# Patient Record
Sex: MALE | Race: WHITE
[De-identification: names, ages, dates, MRNs, and addresses within clinical notes are randomized per-mention and may not be internally consistent; named-entity substitution may affect disease eponyms.]

---

## 2018-09-18 ENCOUNTER — HOSPITAL ENCOUNTER (EMERGENCY)
Dept: HOSPITAL 62 - ER | Age: 58
Discharge: HOME | End: 2018-09-18
Payer: SELF-PAY

## 2018-09-18 VITALS — SYSTOLIC BLOOD PRESSURE: 121 MMHG | DIASTOLIC BLOOD PRESSURE: 94 MMHG

## 2018-09-18 DIAGNOSIS — R42: ICD-10-CM

## 2018-09-18 DIAGNOSIS — E86.0: Primary | ICD-10-CM

## 2018-09-18 DIAGNOSIS — I10: ICD-10-CM

## 2018-09-18 DIAGNOSIS — R91.1: ICD-10-CM

## 2018-09-18 DIAGNOSIS — R73.9: ICD-10-CM

## 2018-09-18 DIAGNOSIS — G93.9: ICD-10-CM

## 2018-09-18 DIAGNOSIS — R11.0: ICD-10-CM

## 2018-09-18 LAB
ADD MANUAL DIFF: NO
ALBUMIN SERPL-MCNC: 4.2 G/DL (ref 3.5–5)
ALP SERPL-CCNC: 228 U/L (ref 38–126)
ALT SERPL-CCNC: 293 U/L (ref 21–72)
ANION GAP SERPL CALC-SCNC: 14 MMOL/L (ref 5–19)
AST SERPL-CCNC: 148 U/L (ref 17–59)
BASOPHILS # BLD AUTO: 0.1 10^3/UL (ref 0–0.2)
BASOPHILS NFR BLD AUTO: 0.8 % (ref 0–2)
BILIRUB DIRECT SERPL-MCNC: 0.7 MG/DL (ref 0–0.4)
BILIRUB SERPL-MCNC: 1.3 MG/DL (ref 0.2–1.3)
BUN SERPL-MCNC: 28 MG/DL (ref 7–20)
CALCIUM: 10.9 MG/DL (ref 8.4–10.2)
CHLORIDE SERPL-SCNC: 99 MMOL/L (ref 98–107)
CK MB SERPL-MCNC: 1.22 NG/ML (ref ?–4.55)
CK SERPL-CCNC: 98 U/L (ref 55–170)
CO2 SERPL-SCNC: 23 MMOL/L (ref 22–30)
EOSINOPHIL # BLD AUTO: 0.4 10^3/UL (ref 0–0.6)
EOSINOPHIL NFR BLD AUTO: 3 % (ref 0–6)
ERYTHROCYTE [DISTWIDTH] IN BLOOD BY AUTOMATED COUNT: 13.2 % (ref 11.5–14)
GLUCOSE SERPL-MCNC: 66 MG/DL (ref 75–110)
HCT VFR BLD CALC: 50.8 % (ref 37.9–51)
HGB BLD-MCNC: 17.4 G/DL (ref 13.5–17)
LYMPHOCYTES # BLD AUTO: 5.3 10^3/UL (ref 0.5–4.7)
LYMPHOCYTES NFR BLD AUTO: 43.4 % (ref 13–45)
MCH RBC QN AUTO: 30.2 PG (ref 27–33.4)
MCHC RBC AUTO-ENTMCNC: 34.2 G/DL (ref 32–36)
MCV RBC AUTO: 88 FL (ref 80–97)
MONOCYTES # BLD AUTO: 1 10^3/UL (ref 0.1–1.4)
MONOCYTES NFR BLD AUTO: 8 % (ref 3–13)
NEUTROPHILS # BLD AUTO: 5.5 10^3/UL (ref 1.7–8.2)
NEUTS SEG NFR BLD AUTO: 44.8 % (ref 42–78)
PLATELET # BLD: 278 10^3/UL (ref 150–450)
POTASSIUM SERPL-SCNC: 4.3 MMOL/L (ref 3.6–5)
PROT SERPL-MCNC: 7.3 G/DL (ref 6.3–8.2)
RBC # BLD AUTO: 5.75 10^6/UL (ref 4.35–5.55)
SODIUM SERPL-SCNC: 136.4 MMOL/L (ref 137–145)
TOTAL CELLS COUNTED % (AUTO): 100 %
TROPONIN I SERPL-MCNC: < 0.012 NG/ML
WBC # BLD AUTO: 12.3 10^3/UL (ref 4–10.5)

## 2018-09-18 PROCEDURE — 71045 X-RAY EXAM CHEST 1 VIEW: CPT

## 2018-09-18 PROCEDURE — 96375 TX/PRO/DX INJ NEW DRUG ADDON: CPT

## 2018-09-18 PROCEDURE — 84484 ASSAY OF TROPONIN QUANT: CPT

## 2018-09-18 PROCEDURE — 96376 TX/PRO/DX INJ SAME DRUG ADON: CPT

## 2018-09-18 PROCEDURE — S0028 INJECTION, FAMOTIDINE, 20 MG: HCPCS

## 2018-09-18 PROCEDURE — 70450 CT HEAD/BRAIN W/O DYE: CPT

## 2018-09-18 PROCEDURE — 96374 THER/PROPH/DIAG INJ IV PUSH: CPT

## 2018-09-18 PROCEDURE — A9576 INJ PROHANCE MULTIPACK: HCPCS

## 2018-09-18 PROCEDURE — 70553 MRI BRAIN STEM W/O & W/DYE: CPT

## 2018-09-18 PROCEDURE — 93010 ELECTROCARDIOGRAM REPORT: CPT

## 2018-09-18 PROCEDURE — 85025 COMPLETE CBC W/AUTO DIFF WBC: CPT

## 2018-09-18 PROCEDURE — 99291 CRITICAL CARE FIRST HOUR: CPT

## 2018-09-18 PROCEDURE — 96361 HYDRATE IV INFUSION ADD-ON: CPT

## 2018-09-18 PROCEDURE — 93005 ELECTROCARDIOGRAM TRACING: CPT

## 2018-09-18 PROCEDURE — 80053 COMPREHEN METABOLIC PANEL: CPT

## 2018-09-18 PROCEDURE — 36415 COLL VENOUS BLD VENIPUNCTURE: CPT

## 2018-09-18 PROCEDURE — 82553 CREATINE MB FRACTION: CPT

## 2018-09-18 PROCEDURE — 82550 ASSAY OF CK (CPK): CPT

## 2018-09-18 PROCEDURE — 82962 GLUCOSE BLOOD TEST: CPT

## 2018-09-18 NOTE — RADIOLOGY REPORT (SQ)
EXAM DESCRIPTION:  CT HEAD WITHOUT



COMPLETED DATE/TIME:  9/18/2018 1:10 pm



REASON FOR STUDY:  Difficulty walking/dizziness



COMPARISON:  None.



TECHNIQUE:  Axial images acquired through the brain without intravenous contrast.  Images reviewed wi
th bone, brain and subdural windows.  Additional sagittal and coronal reconstructions were generated.
 Images stored on PACS.

All CT scanners at this facility use dose modulation, iterative reconstruction, and/or weight based d
osing when appropriate to reduce radiation dose to as low as reasonably achievable (ALARA).

[facility]: Dose Right  [facility]: CareDose    [facility]: Dose Right    [facility]: Teradose 4D    [facility]: Smart Technologies



RADIATION DOSE:  CT Rad equipment meets quality standard of care and radiation dose reduction techniq
ues were employed. CTDIvol: 53.2 mGy. DLP: 1124 mGy-cm. mGy.



LIMITATIONS:  None.



FINDINGS:  VENTRICLES: Mild dilatation of the lateral and 3rd ventricles.

CEREBRUM: There are scattered subcentimeter foci of high attenuation in both cerebral hemispheres. [place]
spect these are partially calcified metastatic lesions although cannot entirely exclude petechial hem
orrhages.  There is approximately 1.3 cm partially calcified lesion in the pineal gland.

CEREBELLUM: Cannot exclude similar lesions in the posterior fossa however these are less well defined
.

EXTRAAXIAL SPACES: No fluid collections.  No masses.

ORBITS AND GLOBE: No intra- or extraconal masses.  Normal contour of globe without masses.

CALVARIUM: No fracture.

PARANASAL SINUSES: Trace fluid right sphenoid sinus.

SOFT TISSUES: No mass or hematoma.

OTHER: No other significant finding.



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
nd scattered potential metastatic lesions.  Other possibilities include infectious or inflammatory pr
ocess (sarcoid) with an incidental pineal mass.

EVIDENCE OF ACUTE STROKE: NO.



COMMENT:  Findings were called to Dr Badillo at 1315 hours.

Quality ID # [id number]: Final reports with documentation of one or more dose reduction techniques (e.g., Au
tomated exposure control, adjustment of the mA and/or kV according to patient size, use of iterative 
reconstruction technique)



TECHNICAL DOCUMENTATION:  JOB ID:  [id number]

 2011 [employer]- All Rights Reserved



Reading location - [facility]station [facility]

## 2018-09-18 NOTE — ER DOCUMENT REPORT
ED Medical Screen (RME)





- General


Chief Complaint: Breathing Difficulty


Stated Complaint: DIFFICULTY BREATHING,DIZZY


Time Seen by Provider: 09/18/18 11:44


Notes: 





58 years old male with a history of hypertension presents today with 2-3 day 

history of persistent nausea unable to eat anything feeling dizzy wobbly when 

walking tends to fall, as well as difficulty in breathing.


He is otherwise very active individual who works 7 days a week.  Strenuous work.


Denies any chest pain.  Denies any left arm numbness tingling sensation.  

Denies any focal weaknesses.





On examination-appears slightly confused hypotensive and hypoglycemic.  Appears 

weak and dehydrated.


TRAVEL OUTSIDE OF THE [place] IN LAST 30 DAYS: No





- Related Data


Allergies/Adverse Reactions: 


 





No Known Allergies Allergy (Unverified 09/18/18 11:22)


 











Past Medical History





- Social History


Chew tobacco use (# tins/day): No


Drug Abuse: None


Renal/ Medical History: Denies: Hx Peritoneal Dialysis





Physical Exam





- Vital signs


Vitals: 





 











Temp Pulse Resp BP Pulse Ox


 


 97.4 F   93   14   68/41 L  93 


 


 09/18/18 11:31  09/18/18 11:31  09/18/18 11:31  09/18/18 11:31  09/18/18 11:31














Course





- Vital Signs


Vital signs: 





 











Temp Pulse Resp BP Pulse Ox


 


 97.4 F   93   14   68/41 L  93 


 


 09/18/18 11:31  09/18/18 11:31  09/18/18 11:31  09/18/18 11:31  09/18/18 11:31

## 2018-09-18 NOTE — EKG REPORT
SEVERITY:- ABNORMAL ECG -

SINUS RHYTHM

PROBABLE LEFT ATRIAL ABNORMALITY

CONSIDER RIGHT VENTRICULAR HYPERTROPHY

BORDERLINE T ABNORMALITIES, INFERIOR LEADS

PROLONGED QT INTERVAL

:

Confirmed by: Roxann Alamo MD 18-Sep-2018 15:25:06

## 2018-09-18 NOTE — RADIOLOGY REPORT (SQ)
EXAM DESCRIPTION:  CHEST SINGLE VIEW



COMPLETED DATE/TIME:  9/18/2018 1:03 pm



REASON FOR STUDY:  Shortness of breath



COMPARISON:  None.



EXAM PARAMETERS:  NUMBER OF VIEWS: One view.

TECHNIQUE: Single frontal radiographic view of the chest acquired.

RADIATION DOSE: NA

LIMITATIONS: None.



FINDINGS:  LUNGS AND PLEURA: Cannot exclude 13 mm suprahilar nodule on the right.  No acute infiltrat
e or pleural effusion.

MEDIASTINUM AND HILAR STRUCTURES: No masses.  Contour normal.

HEART AND VASCULAR STRUCTURES: Heart normal in size.  Normal vasculature.

BONES: No acute findings.

HARDWARE: None in the chest.

OTHER: No other significant finding.



IMPRESSION:  Possible small suprahilar pulmonary nodule on the right.



TECHNICAL DOCUMENTATION:  JOB ID:  [id number]

 2011 [employer]- All Rights Reserved



Reading location - IP/workstation name: [email or web address]

## 2018-09-18 NOTE — ER DOCUMENT REPORT
ED General





- General


Chief Complaint: Breathing Difficulty


Stated Complaint: DIFFICULTY BREATHING,DIZZY


Time Seen by Provider: 09/18/18 11:44


TRAVEL OUTSIDE OF THE [place] IN LAST 30 DAYS: No





- HPI


Patient complains to provider of: Dizziness nausea feeling unwell


Notes: 





Patient coming in for dizziness feeling unwell nausea over the last few days.  

Patient states decreased p.o. intake.  Upon patient's evaluation was found to 

be markedly hypertensive and hyperglycemic.  Patient had multiple liters of 

fluid instilled and also was given something to eat.  Recheck of Accu-Chek that 

showed increase of his blood sugar.  Patient otherwise denies any past medical 

history denies any head trauma.  Denies any fevers chills diarrhea exposure to 

contaminated water or food.  Patient resting comfortably upon my evaluation 

denies any head pain chest pain abdominal pain.





- Related Data


Allergies/Adverse Reactions: 


 





No Known Allergies Allergy (Unverified 09/18/18 11:22)


 











Past Medical History





- Social History


Smoking Status: Never Smoker


Chew tobacco use (# tins/day): No


Drug Abuse: None


Family History: Reviewed & Not Pertinent


Patient has suicidal ideation: No


Patient has homicidal ideation: No


Renal/ Medical History: Denies: Hx Peritoneal Dialysis





Review of Systems





- Review of Systems


Constitutional: Other - Dizziness feeling unwell


EENT: No symptoms reported


Cardiovascular: No symptoms reported


Respiratory: No symptoms reported


Gastrointestinal: Nausea


Genitourinary: No symptoms reported


Male Genitourinary: No symptoms reported


Musculoskeletal: No symptoms reported


Skin: No symptoms reported


Hematologic/Lymphatic: No symptoms reported


Neurological/Psychological: No symptoms reported


-: Yes All other systems reviewed and negative





Physical Exam





- Vital signs


Vitals: 


 











Temp Pulse Resp BP Pulse Ox


 


 97.4 F   93   14   68/41 L  93 


 


 09/18/18 11:31  09/18/18 11:31  09/18/18 11:31  09/18/18 11:31  09/18/18 11:31











Interpretation: Normal





- General


General appearance: Appears well, Alert





- HEENT


Head: Normocephalic, Atraumatic


Eyes: Normal


Pupils: PERRL





- Respiratory


Respiratory status: No respiratory distress


Chest status: Nontender


Breath sounds: Normal


Chest palpation: Normal





- Cardiovascular


Rhythm: Regular


Heart sounds: Normal auscultation


Murmur: No





- Abdominal


Inspection: Normal


Distension: No distension


Bowel sounds: Normal


Tenderness: Nontender


Organomegaly: No organomegaly





- Back


Back: Normal, Nontender





- Extremities


General upper extremity: Normal inspection, Nontender, Normal color, Normal ROM

, Normal temperature


General lower extremity: Normal inspection, Nontender, Normal color, Normal ROM

, Normal temperature, Normal weight bearing.  No: Benitez's sign





- Neurological


Neuro grossly intact: Yes


Cognition: Normal


Orientation: AAOx4


Pia Coma Scale Eye Opening: Spontaneous


[place] Coma Scale Verbal: Oriented


Pia Coma Scale Motor: Obeys Commands


Pia Coma Scale Total: 15


Speech: Normal


Motor strength normal: LUE, RUE, LLE, RLE


Sensory: Normal





- Psychological


Associated symptoms: Normal affect, Normal mood





- Skin


Skin Temperature: Warm


Skin Moisture: Dry


Skin Color: Normal





Course





- Re-evaluation


Re-evalutation: 





09/18/18 19:20


Patient had a CT scan of the head ordered in triage unfortunately showing 

multiple lesions in the brain calcifications possible small hemorrhagic areas 

as well discussion with the radiologist agrees with MRI for further evaluation 

unfortunately MRI showed multiple lesions concerning for metastatic cancer.  

Possibility of this being a long as patient does have a nodule on chest x-ray.  

Patient denies a history of being diagnosed with cancer the discussed the 

findings with our gynecologist on-call Dr. Mitchell requesting high-dose 

Decadron be given to the patient along with Pepcid Zofran for nausea and for 

the patient follow-up in her office tomorrow as that the lesions look to be 

causing any compression or impending herniation patient agrees with this 

assessment plan.  Hypotension resolved after multiple boluses of fluid more 

likely due to marked dehydration.  Patient able tolerate p.o. patient agrees 

with plan a follow-up





- Vital Signs


Vital signs: 


 











Temp Pulse Resp BP Pulse Ox


 


 97.4 F   93   21 H  110/79   93 


 


 09/18/18 11:31  09/18/18 11:31  09/18/18 14:01  09/18/18 14:01  09/18/18 11:31














- Laboratory


Result Diagrams: 


 09/18/18 12:34





 09/18/18 12:34


Laboratory results interpreted by me: 


 











  09/18/18 09/18/18





  12:34 12:34


 


WBC  12.3 H 


 


RBC  5.75 H 


 


Hgb  17.4 H 


 


Absolute Lymphocytes  5.3 H 


 


Sodium   136.4 L


 


BUN   28 H


 


Creatinine   1.76 H


 


Est GFR ( Amer)   49 L


 


Est GFR (Non-Af Amer)   40 L


 


Glucose   66 L


 


Calcium   10.9 H


 


Direct Bilirubin   0.7 H


 


AST   148 H


 


ALT   293 H


 


Alkaline Phosphatase   228 H














Critical Care Note





- Critical Care Note


Total time excluding time spent on procedures (mins): 35


Comments: 





Multiple evaluations as patient was hypotensive due to marked dehydration 

requiring multiple liters of fluid





Discharge





- Discharge


Clinical Impression: 


 Mass, brain, Dehydration





Nausea & vomiting


Qualifiers:


 Vomiting type: unspecified Vomiting Intractability: unspecified Qualified Code(

s): R11.2 - Nausea with vomiting, unspecified





Condition: Good


Instructions:  Dehydration ([facility]), Growth or Mass, Pending Workup ([facility]), Nausea 

or Vomiting, Nonspecific ([facility])


Additional Instructions: 


Your MRI of your brain today shows multiple lesions that is concerning for 

possible metastatic brain cancer.  This will need further workup by 

gynecologist.  I spoke to her oncologist on call Dr. Mitchell who requested that 

she go to her office tomorrow at 1:00 to be seen.  In the interim we will give 

you nausea medication and we will start you on steroids, Decadron.  Please take 

these as prescribed.  Return to the ER for any other concerns.  Otherwise your 

laboratory studies not show any other acute pathology.  More likely her 

symptoms that you are experiencing dizziness were more likely due to 

dehydration from your nausea and vomiting.  Return to ER for any other concerns.


Prescriptions: 


Ondansetron [Zofran Odt 4 mg Tablet] 4 mg PO Q4HP PRN #30 tab.rapdis


 PRN Reason: 


Dexamethasone [Decadron 4 Mg Tablet] 8 mg PO Q8 7 Days #21 tablet


Famotidine [Pepcid 20 mg Tablet] 20 mg PO BID #30 tablet


Referrals: 


DANIELLE MITCHELL MD [ACTIVE STAFF] - Follow up tomorrow (Follow-up tomorrow at 1

:00 PM)

## 2018-09-18 NOTE — RADIOLOGY REPORT (SQ)
EXAM DESCRIPTION:  MRI HEAD COMBO



COMPLETED DATE/TIME:  9/18/2018 3:23 pm



REASON FOR STUDY:  Eval metastatic brain disease versus hemorrhages



COMPARISON:  None.



TECHNIQUE:  Multiplanar imaging includes noncontrasted T1, T2, FLAIR, diffusion with ADC map and post
gadolinium contrast T1 sequences. Images stored on PACS.



CONTRAST TYPE AND DOSE:  15 mL Dotarem.



RENAL FUNCTION:  GFR 40



LIMITATIONS:  None.



FINDINGS:  ANATOMY: Pituitary fossa is filled with enlarged pituitary or intrinsic mass.  No normal n
eurohypophyseal tissue is identified.  Pineal measuring about 2.5 by 1.6 by 1.6 cm AP by transverse b
y craniocaudal diameter partially obstructing cerebral aqueduct.

CSF SPACES: Mild dilatation of the lateral and 3rd ventricles

CEREBRUM: Multiple subcentimeter ring-enhancing lesions in both cerebral hemispheres.  No evidence of
 hemorrhage or significant mass effect.  No significant edema.

POSTERIOR FOSSA: Similar multiple ring-enhancing lesions in both cerebellar hemispheres.

DIFFUSION IMAGING: No evidence of acute infarct.

ORBITS: No masses. Globes normal.

PARANASAL SINUSES: Small amount of fluid left sphenoid sinus.

OTHER: No other significant finding.



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  Met
astatic melanoma could have this appearance, but ultimately the patient will need thorough oncologic 
workup.  Mild dilatation of the lateral and 3rd ventricles due to partial obstruction of the cerebral
 aqueduct.  No hemorrhage.

EVIDENCE OF ACUTE STROKE: NO.



TECHNICAL DOCUMENTATION:  JOB ID:  [id number]

 2011 [employer]- All Rights Reserved



Reading location - IP/workstation name: [email or web address]

## 2018-09-24 ENCOUNTER — HOSPITAL ENCOUNTER (OUTPATIENT)
Dept: HOSPITAL 62 - LAB | Age: 58
End: 2018-09-24
Attending: RADIOLOGY
Payer: SELF-PAY

## 2018-09-24 DIAGNOSIS — C79.31: ICD-10-CM

## 2018-09-24 DIAGNOSIS — C34.01: Primary | ICD-10-CM

## 2018-09-24 LAB
ANION GAP SERPL CALC-SCNC: 11 MMOL/L (ref 5–19)
BUN SERPL-MCNC: 39 MG/DL (ref 7–20)
CALCIUM: 9.3 MG/DL (ref 8.4–10.2)
CHLORIDE SERPL-SCNC: 98 MMOL/L (ref 98–107)
CO2 SERPL-SCNC: 23 MMOL/L (ref 22–30)
GLUCOSE SERPL-MCNC: 115 MG/DL (ref 75–110)
POTASSIUM SERPL-SCNC: 4.7 MMOL/L (ref 3.6–5)
SODIUM SERPL-SCNC: 132.3 MMOL/L (ref 137–145)

## 2018-09-24 PROCEDURE — 80048 BASIC METABOLIC PNL TOTAL CA: CPT

## 2018-09-24 PROCEDURE — 36415 COLL VENOUS BLD VENIPUNCTURE: CPT

## 2018-09-29 ENCOUNTER — HOSPITAL ENCOUNTER (EMERGENCY)
Dept: HOSPITAL 62 - ER | Age: 58
LOS: 1 days | Discharge: ANOTHER HOSPITAL | End: 2018-09-30
Payer: SELF-PAY

## 2018-09-29 DIAGNOSIS — Z90.49: ICD-10-CM

## 2018-09-29 DIAGNOSIS — R11.10: ICD-10-CM

## 2018-09-29 DIAGNOSIS — R10.9: Primary | ICD-10-CM

## 2018-09-29 DIAGNOSIS — F17.210: ICD-10-CM

## 2018-09-29 DIAGNOSIS — R53.83: ICD-10-CM

## 2018-09-29 DIAGNOSIS — K59.00: ICD-10-CM

## 2018-09-29 PROCEDURE — 80053 COMPREHEN METABOLIC PANEL: CPT

## 2018-09-29 PROCEDURE — 85025 COMPLETE CBC W/AUTO DIFF WBC: CPT

## 2018-09-29 PROCEDURE — 83690 ASSAY OF LIPASE: CPT

## 2018-09-29 PROCEDURE — 96361 HYDRATE IV INFUSION ADD-ON: CPT

## 2018-09-29 PROCEDURE — 96374 THER/PROPH/DIAG INJ IV PUSH: CPT

## 2018-09-29 PROCEDURE — 99285 EMERGENCY DEPT VISIT HI MDM: CPT

## 2018-09-29 PROCEDURE — 76705 ECHO EXAM OF ABDOMEN: CPT

## 2018-09-29 PROCEDURE — 36415 COLL VENOUS BLD VENIPUNCTURE: CPT

## 2018-09-29 PROCEDURE — 74018 RADEX ABDOMEN 1 VIEW: CPT

## 2018-09-30 VITALS — DIASTOLIC BLOOD PRESSURE: 88 MMHG | SYSTOLIC BLOOD PRESSURE: 147 MMHG

## 2018-09-30 LAB
ADD MANUAL DIFF: NO
ALBUMIN SERPL-MCNC: 3.5 G/DL (ref 3.5–5)
ALP SERPL-CCNC: 521 U/L (ref 38–126)
ALT SERPL-CCNC: 919 U/L (ref 21–72)
ANION GAP SERPL CALC-SCNC: 11 MMOL/L (ref 5–19)
AST SERPL-CCNC: 368 U/L (ref 17–59)
BASOPHILS # BLD AUTO: 0 10^3/UL (ref 0–0.2)
BASOPHILS NFR BLD AUTO: 0.3 % (ref 0–2)
BILIRUB DIRECT SERPL-MCNC: 7.8 MG/DL (ref 0–0.4)
BILIRUB SERPL-MCNC: 8.9 MG/DL (ref 0.2–1.3)
BUN SERPL-MCNC: 39 MG/DL (ref 7–20)
CALCIUM: 9.2 MG/DL (ref 8.4–10.2)
CHLORIDE SERPL-SCNC: 101 MMOL/L (ref 98–107)
CO2 SERPL-SCNC: 22 MMOL/L (ref 22–30)
EOSINOPHIL # BLD AUTO: 0.1 10^3/UL (ref 0–0.6)
EOSINOPHIL NFR BLD AUTO: 0.5 % (ref 0–6)
ERYTHROCYTE [DISTWIDTH] IN BLOOD BY AUTOMATED COUNT: 14.1 % (ref 11.5–14)
GLUCOSE SERPL-MCNC: 99 MG/DL (ref 75–110)
HCT VFR BLD CALC: 43.9 % (ref 37.9–51)
HGB BLD-MCNC: 15.3 G/DL (ref 13.5–17)
LYMPHOCYTES # BLD AUTO: 1.2 10^3/UL (ref 0.5–4.7)
LYMPHOCYTES NFR BLD AUTO: 6.4 % (ref 13–45)
MCH RBC QN AUTO: 31 PG (ref 27–33.4)
MCHC RBC AUTO-ENTMCNC: 34.7 G/DL (ref 32–36)
MCV RBC AUTO: 89 FL (ref 80–97)
MONOCYTES # BLD AUTO: 1.1 10^3/UL (ref 0.1–1.4)
MONOCYTES NFR BLD AUTO: 6.1 % (ref 3–13)
NEUTROPHILS # BLD AUTO: 15.8 10^3/UL (ref 1.7–8.2)
NEUTS SEG NFR BLD AUTO: 86.7 % (ref 42–78)
PLATELET # BLD: 326 10^3/UL (ref 150–450)
POTASSIUM SERPL-SCNC: 4.6 MMOL/L (ref 3.6–5)
PROT SERPL-MCNC: 6.6 G/DL (ref 6.3–8.2)
RBC # BLD AUTO: 4.92 10^6/UL (ref 4.35–5.55)
SODIUM SERPL-SCNC: 133.6 MMOL/L (ref 137–145)
TOTAL CELLS COUNTED % (AUTO): 100 %
WBC # BLD AUTO: 18.2 10^3/UL (ref 4–10.5)

## 2018-09-30 NOTE — ER DOCUMENT REPORT
ED General





- General


Mode of Arrival: Ambulatory


Information source: Patient


TRAVEL OUTSIDE OF THE [place] IN LAST 30 DAYS: No





<GEORGINA GONCALVES - Last Filed: 09/30/18 06:58>





<ADDI SALOMON - Last Filed: 09/30/18 08:24>





- General


Chief Complaint: Abdominal Pain


Stated Complaint: DEHYDRATED


Time Seen by Provider: 09/30/18 00:32


Notes: 





Patient is a 58-year-old male who presents with chief complaint of mid 

abdominal pain, constipation, dark urine and vomiting.  Patient reports last 

normal bowel movement was approximately 10 days ago.  Patient was diagnosed 

with a metastatic brain tumor approximately 2 weeks ago and is currently 

undergoing radiation treatments.  Patient reports past surgical history of 

cholecystectomy.  Patient denies the use of alcohol or illicit drugs.  Reports 

that he smokes approximately 1/2 pack/day.  Patient denies history of any liver 

problems. (GEORGINA GONCALVES)





- Related Data


Allergies/Adverse Reactions: 


 





No Known Allergies Allergy (Verified 09/29/18 23:37)


 











Past Medical History





- Social History


Family History: Reviewed & Not Pertinent


Renal/ Medical History: Denies: Hx Peritoneal Dialysis





<GEORGINA GONCALVES - Last Filed: 09/30/18 06:58>





- Social History


Smoking Status: Unknown if Ever Smoked


Family History: Reviewed & Not Pertinent





<ADDI SALOMON - Last Filed: 09/30/18 08:24>





Review of Systems





- Review of Systems


Constitutional: Other - Fatigue


Cardiovascular: denies: Chest pain, Palpitations


Respiratory: denies: Short of breath


Gastrointestinal: Abdominal pain, Nausea, Vomiting, Poor appetite, Poor fluid 

intake, Last bowel movement - 2 weeks ago


Genitourinary: Other - Dark urine.  denies: Dysuria, Flank pain


Skin: Change in color


-: Yes All other systems reviewed and negative





<GEORGINA GONCALVES - Last Filed: 09/30/18 06:58>





Physical Exam





<GEORGINA GONCALVES - Last Filed: 09/30/18 06:58>





<ADDI SALOMON - Last Filed: 09/30/18 08:24>





- Vital signs


Vitals: 





 











Temp Pulse Resp BP Pulse Ox


 


 98.2 F   93   16   105/70   99 


 


 09/29/18 23:44  09/29/18 23:44  09/29/18 23:44  09/29/18 23:44  09/29/18 23:44














- Notes


Notes: 





PHYSICAL EXAMINATION:





GENERAL: Well-appearing, well-nourished and in no acute distress.





HEAD: Atraumatic, normocephalic.





EYES: Pupils equal round and reactive to light, extraocular movements intact, 

sclera anicteric, conjunctiva yellow.





ENT: Nares patent, oropharynx clear without exudates.  Moist mucous membranes.





NECK: Normal range of motion, supple without lymphadenopathy





LUNGS: Breath sounds clear to auscultation bilaterally and equal.  No wheezes 

rales or rhonchi.





HEART: Regular rate and rhythm without murmurs





ABDOMEN: Soft, nontender, nondistended abdomen.  No guarding, no rebound.  No 

masses appreciated.





Musculoskeletal: Normal range of motion, no pitting or edema.  No cyanosis.





NEUROLOGICAL: Cranial nerves grossly intact.  Normal speech, normal gait.  

Normal sensory, motor exams 





PSYCH: Normal mood, normal affect.





SKIN: Warm, Dry, delayed turgor, no rashes or lesions noted.  Jaundiced. (GEORGINA GONCALVES)





Course





- Laboratory


Result Diagrams: 


 09/30/18 01:50





 09/30/18 01:50





<GEORGINA GONCALVES - Last Filed: 09/30/18 06:58>





- Laboratory


Result Diagrams: 


 09/30/18 01:50





 09/30/18 01:50





<ADDI SALOMON - Last Filed: 09/30/18 08:24>





- Re-evaluation


Re-evalutation: 





09/30/18 01:04


Patient appears to have significant jaundice.  Patient denies any history of 

any liver abnormalities.  Patient normotensive and without tachycardia or 

fever.  Will 


Obtain labs, urine and KUB.  Patient will be given antiemetics as well as IV 

fluids pending labs.





Patient with leukocytosis, white blood count is 18.2.  Chemistry reveals a 

sodium of 133.6.  BUN is elevated at 39.  Hyper bilirubinemia is noted with 

total bili of 8.9, direct bili 7.8.  , , alk phos 521, lipase 

6936.  Right upper quadrant ultrasound reveals dilated common bile duct at 1.4 

cm.  KUB x-ray shows large amount of stool burden near the colon.  Patient 

remains stable, is resting comfortably in his room without any complaints.





09/30/18 05:00


Call placed to [facility] for possible patient transfer for ERCP.





09/30/18 05:40


Patient accepted for transfer to [facility], 

 accepting.  Patient updated on plan of care, patient denies any 

needs at this time.  Patient is n.p.o.





09/30/18 07:00


Handoff given to LUISITO Salomon PA-C.  Awaiting transport, ETA 0900.  Patient 

denies any needs at this time. (GEORGINA GONCALVES)








09/30/18 08:23


Patient has no new concerns or complaints.  Vitals are acceptable.  Patient is 

stable for transfer at this time. (ADDI SALOMON)





- Vital Signs


Vital signs: 





 











Temp Pulse Resp BP Pulse Ox


 


 97.9 F   66   16   147/88 H  100 


 


 09/30/18 08:18  09/30/18 08:18  09/30/18 08:18  09/30/18 08:18  09/30/18 08:18














- Laboratory


Laboratory results interpreted by me: 





 











  09/30/18 09/30/18





  01:50 01:50


 


WBC  18.2 H 


 


RDW  14.1 H 


 


Seg Neutrophils %  86.7 H 


 


Lymphocytes %  6.4 L 


 


Absolute Neutrophils  15.8 H 


 


Sodium   133.6 L


 


BUN   39 H


 


Creatinine   1.41 H


 


Est GFR (Non-Af Amer)   52 L


 


Total Bilirubin   8.9 H


 


Direct Bilirubin   7.8 H


 


AST   368 H


 


ALT   919 H


 


Alkaline Phosphatase   521 H


 


Lipase   6936.0 H














Discharge





- Discharge


Unit Admitted: Medical Floor





<GEORGINA GONCALVES - Last Filed: 09/30/18 06:58>





<ADDI SALOMON - Last Filed: 09/30/18 08:24>





- Discharge


Clinical Impression: 


 Hyperbilirubinemia, Elevated LFTs, Common bile duct dilatation





Abdominal pain


Qualifiers:


 Abdominal location: epigastric Qualified Code(s): R10.13 - Epigastric pain





Constipation


Qualifiers:


 Constipation type: unspecified constipation type Qualified Code(s): K59.00 - 

Constipation, unspecified





Condition: Stable


Disposition: [facility]


Referrals: 


BENITO GERMAN,  [Primary Care Provider] - Follow up as needed

## 2018-09-30 NOTE — RADIOLOGY REPORT (SQ)
EXAM DESCRIPTION:

US ABDOMEN LIMITED



COMPLETED DATE/TME:  09/30/2018 03:10



CLINICAL HISTORY:

58 years Male, epigastric pain



Comparison: None.



LIMITATIONS: None.



FINDINGS:



Cholecystectomy, liver, a 1.4-cm diameter common bile duct with

possible punctate echogenic debris/stone, moderate intrahepatic

ductal dilation, 11-cm right kidney, partially obscured pancreas,

prominent pancreatic duct, visualized vasculature/abdominal

aorta, and no significant ascites appear otherwise unremarkable.



IMPRESSION:



1.  Hepatobiliary ductal enlargement. Limited visualization of

the pancreas. Findings are consistent with prior abnormal CT from

nine days ago. Consider further evaluation with MRCP and/or

contrast MRI of the abdomen/pancreas.

2.  Cholecystectomy.

## 2018-09-30 NOTE — RADIOLOGY REPORT (SQ)
EXAM DESCRIPTION: 



XR ABDOMEN 1 VIEW (KUB)



COMPLETED DATE/TME:  09/30/2018 00:50



CLINICAL HISTORY: 



58 years, Male, eval for constipation



COMPARISON:

None.



NUMBER OF VIEWS:

One



TECHNIQUE:

AP view of the abdomen



LIMITATIONS:

None.



FINDINGS:



There are no dilated loops of small bowel. There is a large

amount of stool within the colon. Contrast is noted within the

descending colon and rectum. There are no abnormal

calcifications. Cholecystectomy clips are noted. There is no

acute fracture.



IMPRESSION:



Nonobstructing bowel gas pattern. Large amount of stool within

the colon

 



 2011 [employer]- All Rights Reserved

## 2018-10-07 ENCOUNTER — HOSPITAL ENCOUNTER (OUTPATIENT)
Dept: HOSPITAL 62 - RAD | Age: 58
End: 2018-10-07
Attending: INTERNAL MEDICINE
Payer: SELF-PAY

## 2018-10-07 DIAGNOSIS — G93.9: Primary | ICD-10-CM

## 2018-10-07 DIAGNOSIS — C79.9: ICD-10-CM

## 2018-10-07 PROCEDURE — A9552 F18 FDG: HCPCS

## 2018-10-07 PROCEDURE — 78815 PET IMAGE W/CT SKULL-THIGH: CPT

## 2018-10-08 NOTE — RADIOLOGY REPORT (SQ)
EXAM DESCRIPTION:  PET CT SKULL/THIGH



COMPLETED DATE/TIME:  10/7/2018 10:11 pm



REASON FOR STUDY:  DISORDER OF BRAIN G93.9  DISORDER OF BRAIN, UNSPECIFIED metastatic melanoma



COMPARISON:  CT chest abdomen pelvis 9/21/2018

Abdominal ultrasound 9/30/2018

CT brain and MRI brain 9/18/2018



RADIONUCLIDE AND DOSE:  11.2 mCi F18 FDG

The route of agent administration: Intravenous



FASTING BLOOD SUGAR:  86 mg/dl



CONTRAST TYPE AND DOSE:  No CT contrast given.



TECHNIQUE:  Blood glucose level was verified.  Above dose of FDG was injected intravenously.  2-D seg
mented attenuation correction images were obtained from the base of the skull to the midthighs.  Nonc
ontrast CT images were obtained for attenuation correction and fusion with emission images.  CT image
s were performed without oral or intravenous contrast and are not sensitive for parenchymal lesions. 
 A series of overlapping emission PET images were obtained.  Images reviewed and manipulated at [facility] work station by the radiologist.  Images stored on PACS.



LIMITATIONS:  None.



FINDINGS:  HEAD AND NECK: A 1.4 x 0.8 cm lymph node in the right submandibular triangle (level 1 B) i
s present on axial image 38, with SUV of 6.2.

A 1.2 x 1.2 cm supraclavicular levels 6 lymph node is present on axial image 75 with SUV of 3.5.

CHEST: There is a right upper lobe mass, 2.4 x 1.7 cm in size on axial image 85 with SUV 6.7 (was 1.7
 cm greatest diameter 9/21/2018).

There is mediastinal and hilar adenopathy as follows:

Right paratracheal conglomerate lymph node mass 3.5 x 2.7 cm axial image 84, SUV 10.1 (was 2.4 x 1.9 
cm on 9/21/2018).

Precarinal 5 x 2.3 cm ruy mass axial image 95, SUV 8.1 (was 2.7 x 2.3 cm in size on 9/21/2018)

Left upper hilar 1.7 x 0.9 cm lymph node axial image 96, SUV 5.8

There is right axillary adenopathy as follows:

1.5 x 0.9 cm lymph node axial image 92, SUV 2.9

1.6 x 1.4 cm lymph node axial image 101, SUV 5.7

ABDOMEN AND PELVIS: Air is present within intrahepatic bile ducts related to a distal common bile ga
t wall stent.  Post cholecystectomy.  There is a pancreatic head mass 3.7 x 3.6 cm in size on axial i
mage 168 with SUV of 7.2.  A second, smaller subcentimeter nodule in the pancreatic tail is present w
ith SUV of 7.5.

There is hypermetabolic upper abdominal adenopathy, index lesions are as follows:

Celiac lymph node mass 4.6 x 4 cm axial image 149, with SUV of 6

Retrocaval lymph node 2.2 x 1.4 cm in size image 152, with SUV of 7 (was 1.7 cm greatest diameter 9/2
1/2018).

A 2 cm right adrenal nodule is present with SUV 7.4.  A 2 cm left adrenal nodule is present with SUV 
6.3.

Mesenteric lymph nodes 1 cm in size are present in the left lower quadrant, SUV of 5

PROXIMAL LOWER EXTREMITIES: No areas of abnormal metabolic activity in the soft tissues of the lower 
extremities.

BONES: Mild increased uptake is present in the T7 vertebral body with SUV of 3.6 worrisome for metast
atic disease.

ADDITIONAL CT FINDINGS: Colon diverticuli.  Coronary artery calcifications.

OTHER: Liver background activity SUV 2.0.  Blood pool background activity 1.5 SUV.



IMPRESSION:  Widespread metastatic disease from melanoma as above.



TECHNICAL DOCUMENTATION:  JOB ID:  [id number]

 2011 [employer]- All Rights Reserved



Reading location - IP/workstation name: [facility]

## 2018-10-09 ENCOUNTER — HOSPITAL ENCOUNTER (OUTPATIENT)
Dept: HOSPITAL 62 - RAD | Age: 58
End: 2018-10-09
Attending: RADIOLOGY
Payer: SELF-PAY

## 2018-10-09 DIAGNOSIS — C34.01: Primary | ICD-10-CM

## 2018-10-09 DIAGNOSIS — C79.31: ICD-10-CM

## 2018-10-09 PROCEDURE — 74018 RADEX ABDOMEN 1 VIEW: CPT

## 2018-10-09 PROCEDURE — 88342 IMHCHEM/IMCYTCHM 1ST ANTB: CPT

## 2018-10-09 PROCEDURE — 88262 CHROMOSOME ANALYSIS 15-20: CPT

## 2018-10-09 PROCEDURE — 88185 FLOWCYTOMETRY/TC ADD-ON: CPT

## 2018-10-09 PROCEDURE — 88341 IMHCHEM/IMCYTCHM EA ADD ANTB: CPT

## 2018-10-09 PROCEDURE — 88184 FLOWCYTOMETRY/ TC 1 MARKER: CPT

## 2018-10-09 PROCEDURE — 88305 TISSUE EXAM BY PATHOLOGIST: CPT

## 2018-10-09 PROCEDURE — 88233 TISSUE CULTURE SKIN/BIOPSY: CPT

## 2018-10-09 PROCEDURE — 38505 NEEDLE BIOPSY LYMPH NODES: CPT

## 2018-10-09 NOTE — RADIOLOGY REPORT (SQ)
EXAM DESCRIPTION:  KUB/ABDOMEN (SINGLE VIEW)



COMPLETED DATE/TIME:  10/9/2018 9:33 am



REASON FOR STUDY:  STRICTURE OF BILE C34.01  MALIGNANT NEOPLASM OF RIGHT MAIN BRONCHUS C79.31  SECOND
ALEKSANDRA MALIGNANT NEOPLASM OF BRAIN



COMPARISON:  9/30/2018



NUMBER OF VIEWS:  One view.



TECHNIQUE:   Supine radiographic image of the abdomen acquired.



LIMITATIONS:  None.



FINDINGS:  BOWEL GAS PATTERN: Normal bowel gas pattern. No dilated loops.

CALCIFICATIONS: No suspicious calcifications.

SOFT TISSUES: No gross mass or suggestion of organomegaly.

HARDWARE: Common bile duct stent.

BONES: No acute fracture. No worrisome bone lesions.

OTHER: No other significant finding.



IMPRESSION:  Nonspecific abdomen.



TECHNICAL DOCUMENTATION:  JOB ID:  [id number]

 2011 [employer]- All Rights Reserved



Reading location - IP/workstation name: [email or web address]

## 2018-10-12 NOTE — RADIOLOGY REPORT (SQ)
EXAM DESCRIPTION:  U/S BIOPSY SUPERFIC LYMPH NODE



COMPLETED DATE/TIME:  10/9/2018 10:39 am



REASON FOR STUDY:  C34.01 MALIGNANT NEOPLASM OF RIGHT MAIN BRONCHUS/C79.31 SECONDARY MALIGNANT C34.01
  MALIGNANT NEOPLASM OF RIGHT MAIN BRONCHUS C79.31  SECONDARY MALIGNANT NEOPLASM OF BRAIN



COMPARISON:  PET-CT dated 10/7/2018.



TECHNIQUE:  The procedure was discussed with the patient and the patient agreed to proceed.

The patient was scanned and the area of interest in the right axilla was localized.  This correlates 
with the area of concern on prior imaging studies. This area was targeted for ultrasound-guided core 
biopsy.

After sterile skin prep and 10 mL local lidocaine 1% for skin and deep tissue anesthesia, a coaxial c
ore biopsy needle was used to obtain several cores of tissue from the lesion.  Under ultrasound citlalli
nce, a marker clip was placed in the area sampled.  There were no immediate post-procedure complicati
ons.

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
inoma).



LIMITATIONS:  None.



FINDINGS:  Ultrasound guided right axillary lymph node biopsy as described above.



IMPRESSION:  ULTRASOUND-GUIDED CORE BIOPSY OF THE RIGHT AXILLARY LYMPH NODE YIELDS A DIAGNOSIS OF MET
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



COMMENT:  COMMUNICATION: The patient's provider has been notified of the findings. The provider will 
discuss the findings with the patient.

Patient medication list reviewed: Yes- Quality ID# [id number]:Eligible professional attests to documenting i
n the medical record they obtained, updated, or reviewed the patient's current medications.



TECHNICAL DOCUMENTATION:  JOB ID:  [id number]

 2011 [employer]- All Rights Reserved



Reading location - IP/workstation name: [email or web address]